# Patient Record
Sex: MALE | Race: WHITE | NOT HISPANIC OR LATINO | Employment: FULL TIME | URBAN - NONMETROPOLITAN AREA
[De-identification: names, ages, dates, MRNs, and addresses within clinical notes are randomized per-mention and may not be internally consistent; named-entity substitution may affect disease eponyms.]

---

## 2017-05-25 ENCOUNTER — OFFICE VISIT (OUTPATIENT)
Dept: FAMILY MEDICINE CLINIC | Facility: CLINIC | Age: 47
End: 2017-05-25

## 2017-05-25 VITALS
DIASTOLIC BLOOD PRESSURE: 90 MMHG | SYSTOLIC BLOOD PRESSURE: 120 MMHG | OXYGEN SATURATION: 98 % | WEIGHT: 177 LBS | HEART RATE: 69 BPM

## 2017-05-25 DIAGNOSIS — T78.3XXS ANGIOEDEMA, SEQUELA: ICD-10-CM

## 2017-05-25 DIAGNOSIS — L50.9 URTICARIA: Primary | ICD-10-CM

## 2017-05-25 PROCEDURE — 99214 OFFICE O/P EST MOD 30 MIN: CPT | Performed by: FAMILY MEDICINE

## 2017-05-25 RX ORDER — EPINEPHRINE 0.3 MG/.3ML
0.3 INJECTION SUBCUTANEOUS ONCE
Qty: 1 EACH | Refills: 3 | Status: SHIPPED | OUTPATIENT
Start: 2017-05-25 | End: 2017-05-25

## 2017-07-21 ENCOUNTER — OFFICE VISIT (OUTPATIENT)
Dept: FAMILY MEDICINE CLINIC | Facility: CLINIC | Age: 47
End: 2017-07-21

## 2017-07-21 VITALS
WEIGHT: 170 LBS | DIASTOLIC BLOOD PRESSURE: 86 MMHG | OXYGEN SATURATION: 98 % | HEIGHT: 70 IN | HEART RATE: 87 BPM | BODY MASS INDEX: 24.34 KG/M2 | SYSTOLIC BLOOD PRESSURE: 110 MMHG

## 2017-07-21 DIAGNOSIS — M54.50 RIGHT-SIDED LOW BACK PAIN WITHOUT SCIATICA, UNSPECIFIED CHRONICITY: ICD-10-CM

## 2017-07-21 DIAGNOSIS — R14.0 ABDOMINAL BLOATING: ICD-10-CM

## 2017-07-21 DIAGNOSIS — L50.9 URTICARIA: ICD-10-CM

## 2017-07-21 DIAGNOSIS — R19.7 DIARRHEA, UNSPECIFIED TYPE: Primary | ICD-10-CM

## 2017-07-21 LAB
BILIRUB BLD-MCNC: NEGATIVE MG/DL
CLARITY, POC: CLEAR
COLOR UR: YELLOW
GLUCOSE UR STRIP-MCNC: NEGATIVE MG/DL
KETONES UR QL: NEGATIVE
LEUKOCYTE EST, POC: NEGATIVE
NITRITE UR-MCNC: NEGATIVE MG/ML
PH UR: 7 [PH] (ref 5–8)
PROT UR STRIP-MCNC: NEGATIVE MG/DL
RBC # UR STRIP: NEGATIVE /UL
SP GR UR: 1.01 (ref 1–1.03)
UROBILINOGEN UR QL: NORMAL

## 2017-07-21 PROCEDURE — 99214 OFFICE O/P EST MOD 30 MIN: CPT | Performed by: FAMILY MEDICINE

## 2017-07-21 PROCEDURE — 81003 URINALYSIS AUTO W/O SCOPE: CPT | Performed by: FAMILY MEDICINE

## 2017-07-21 NOTE — PROGRESS NOTES
Subjective   Jas Porras is a 47 y.o. male.     History of Present Illness   [age]-year-old male who was treated earlier for urticaria.  Has developed loose stool and some right sided back or flank pain.  Also some abdominal bloating .  Went to an urgent treatment center around [place] where he had an ultrasound of his gallbladder.  Apparently all this was okay.  Also ended up seeing a surgeon and also had a CAT scan.  There was some indication that he might have sludge in his gallbladder.  Apparently the surgeon discounted this.  He was recommended to a gastroenterologist and has an appointment to see Dr. Chaidez in August.  He is here today to get my opinion.  He is not had any general blood work.  The following portions of the patient's history were reviewed and updated as appropriate: allergies, current medications, past family history, past medical history, past social history, past surgical history and problem list.    Review of Systems   Constitutional: Negative for chills, fever and unexpected weight change.   HENT: Negative.    Respiratory: Negative for cough and shortness of breath.    Cardiovascular: Negative for chest pain.   Gastrointestinal: Positive for abdominal distention, abdominal pain and diarrhea. Negative for nausea.   Genitourinary: Negative for dysuria and hematuria.   Musculoskeletal: Positive for back pain (right flank painright back pain). Negative for gait problem and joint swelling.   Skin: Negative for rash.   Psychiatric/Behavioral: Negative.        Objective   Physical Exam   Constitutional: He is oriented to person, place, and time. He appears well-developed and well-nourished.   HENT:   Right Ear: External ear normal.   Left Ear: External ear normal.   Nose: Nose normal.   Mouth/Throat: Oropharynx is clear and moist.   Eyes: Conjunctivae and EOM are normal.   Neck: Normal range of motion. Neck supple. No thyromegaly present.   Cardiovascular: Normal rate, regular rhythm and  normal heart sounds.    No murmur heard.  Pulmonary/Chest: Effort normal and breath sounds normal.   Abdominal: Soft. Bowel sounds are normal. He exhibits no mass. There is no tenderness.   Genitourinary: Rectum normal, prostate normal and penis normal.   Musculoskeletal: Normal range of motion. He exhibits no edema.   Lymphadenopathy:     He has no cervical adenopathy.     He has no axillary adenopathy.        Right: No inguinal and no supraclavicular adenopathy present.        Left: No inguinal and no supraclavicular adenopathy present.   Neurological: He is alert and oriented to person, place, and time. Coordination normal.   Skin: Skin is warm and dry. No rash noted. No pallor.   Psychiatric: He has a normal mood and affect. His behavior is normal. Thought content normal.   Vitals reviewed.      Assessment/Plan   Jas was seen today for diarrhea, headache and rash.    Diagnoses and all orders for this visit:    Diarrhea, unspecified type  -     Sedimentation Rate  -     C-reactive protein    Urticaria    Right-sided low back pain without sciatica, unspecified chronicity    Abdominal bloating  -     CBC & Differential  -     Comprehensive Metabolic Panel  -     TSH  -     POC Urinalysis Dipstick, Automated    47-year-old male who has several complaints, some of which may be interrelated or not.  He feels that he has abdominal distention and still has some episodes of urticaria which were diagnosed earlier.  Also some vague flank pain.  It's my opinion, that all of these may be related and as a result of some sort of allergic phenomena.  I told him that he still may end up seeing an allergist.    Diarrhea, more like loose stool and copious diarrhea.  We are going to do some stool studies however along with some general blood work.  I've given him an order to get 3 stool specimens which will include stool culture, O&P ×3 and C. difficile toxin.  I'm doing this and anticipation of his visit with Dr. Chaidez.  I  will follow-up in a week or so to go over these and see how he's doing.  In coordination with Dr. Chaidez will try to resolve this problem.  Again, may involve an allergist.

## 2017-07-22 LAB
ALBUMIN SERPL-MCNC: 4.4 G/DL (ref 3.5–5.5)
ALBUMIN/GLOB SERPL: 1.7 {RATIO} (ref 1.2–2.2)
ALP SERPL-CCNC: 153 IU/L (ref 39–117)
ALT SERPL-CCNC: 13 IU/L (ref 0–44)
AST SERPL-CCNC: 18 IU/L (ref 0–40)
BASOPHILS # BLD AUTO: 0 X10E3/UL (ref 0–0.2)
BASOPHILS NFR BLD AUTO: 0 %
BILIRUB SERPL-MCNC: 0.4 MG/DL (ref 0–1.2)
BUN SERPL-MCNC: 10 MG/DL (ref 6–24)
BUN/CREAT SERPL: 13 (ref 9–20)
CALCIUM SERPL-MCNC: 9.2 MG/DL (ref 8.7–10.2)
CHLORIDE SERPL-SCNC: 103 MMOL/L (ref 96–106)
CO2 SERPL-SCNC: 23 MMOL/L (ref 18–29)
CREAT SERPL-MCNC: 0.78 MG/DL (ref 0.76–1.27)
CRP SERPL-MCNC: 0.9 MG/L (ref 0–4.9)
EOSINOPHIL # BLD AUTO: 0.3 X10E3/UL (ref 0–0.4)
EOSINOPHIL NFR BLD AUTO: 4 %
ERYTHROCYTE [DISTWIDTH] IN BLOOD BY AUTOMATED COUNT: 13.2 % (ref 12.3–15.4)
ERYTHROCYTE [SEDIMENTATION RATE] IN BLOOD BY WESTERGREN METHOD: 4 MM/HR (ref 0–15)
GLOBULIN SER CALC-MCNC: 2.6 G/DL (ref 1.5–4.5)
GLUCOSE SERPL-MCNC: 90 MG/DL (ref 65–99)
HCT VFR BLD AUTO: 45.2 % (ref 37.5–51)
HGB BLD-MCNC: 15.3 G/DL (ref 12.6–17.7)
IMM GRANULOCYTES # BLD: 0 X10E3/UL (ref 0–0.1)
IMM GRANULOCYTES NFR BLD: 0 %
LYMPHOCYTES # BLD AUTO: 2 X10E3/UL (ref 0.7–3.1)
LYMPHOCYTES NFR BLD AUTO: 29 %
MCH RBC QN AUTO: 30.6 PG (ref 26.6–33)
MCHC RBC AUTO-ENTMCNC: 33.8 G/DL (ref 31.5–35.7)
MCV RBC AUTO: 90 FL (ref 79–97)
MONOCYTES # BLD AUTO: 0.5 X10E3/UL (ref 0.1–0.9)
MONOCYTES NFR BLD AUTO: 8 %
NEUTROPHILS # BLD AUTO: 4.1 X10E3/UL (ref 1.4–7)
NEUTROPHILS NFR BLD AUTO: 59 %
PLATELET # BLD AUTO: 117 X10E3/UL (ref 150–379)
POTASSIUM SERPL-SCNC: 3.9 MMOL/L (ref 3.5–5.2)
PROT SERPL-MCNC: 7 G/DL (ref 6–8.5)
RBC # BLD AUTO: 5 X10E6/UL (ref 4.14–5.8)
SODIUM SERPL-SCNC: 143 MMOL/L (ref 134–144)
TSH SERPL DL<=0.005 MIU/L-ACNC: 1.67 UIU/ML (ref 0.45–4.5)
WBC # BLD AUTO: 7 X10E3/UL (ref 3.4–10.8)

## 2017-07-31 ENCOUNTER — OFFICE VISIT (OUTPATIENT)
Dept: FAMILY MEDICINE CLINIC | Facility: CLINIC | Age: 47
End: 2017-07-31

## 2017-07-31 VITALS
WEIGHT: 172 LBS | BODY MASS INDEX: 24.62 KG/M2 | SYSTOLIC BLOOD PRESSURE: 110 MMHG | OXYGEN SATURATION: 98 % | DIASTOLIC BLOOD PRESSURE: 80 MMHG | HEIGHT: 70 IN | HEART RATE: 68 BPM

## 2017-07-31 DIAGNOSIS — L50.9 URTICARIA: ICD-10-CM

## 2017-07-31 DIAGNOSIS — R19.7 DIARRHEA, UNSPECIFIED TYPE: Primary | ICD-10-CM

## 2017-07-31 PROCEDURE — 99212 OFFICE O/P EST SF 10 MIN: CPT | Performed by: FAMILY MEDICINE

## 2017-07-31 NOTE — PROGRESS NOTES
Subjective   Jas Porras is a 47 y.o. male.     History of Present Illness   47-year-old male who's had persistent diarrhea.  Has seen a surgeon in the area of [facility].  He is here to review stool studies that I ordered.  He does have an appointment to see a gastroenterologist.  The following portions of the patient's history were reviewed and updated as appropriate: allergies, current medications, past family history, past medical history, past social history, past surgical history and problem list.    Review of Systems   Constitutional: Negative for fever and unexpected weight change.   Gastrointestinal: Positive for diarrhea. Negative for abdominal distention, nausea and vomiting.       Objective   Physical Exam   Constitutional: He appears well-nourished.   HENT:   Head: Normocephalic.   Pulmonary/Chest: Effort normal.   Neurological: He is alert.       Assessment/Plan   Jas was seen today for diarrhea.    Diagnoses and all orders for this visit:    Diarrhea, unspecified type    Urticaria    Diarrhea.  Here for general follow-up.  I have reviewed the stool studies which should been ordered.  Negative culture.  No O&P.  No CDF.  He has an appointment to see the gastroenterologist and I do think this is the next step.    Urticaria.  I am reminded that he has had some hives.  I did say that he may end up seeing an allergist as all of this may be related to some food allergy etc.

## 2017-08-03 ENCOUNTER — OFFICE VISIT (OUTPATIENT)
Dept: FAMILY MEDICINE CLINIC | Facility: CLINIC | Age: 47
End: 2017-08-03

## 2017-08-03 ENCOUNTER — TELEPHONE (OUTPATIENT)
Dept: FAMILY MEDICINE CLINIC | Facility: CLINIC | Age: 47
End: 2017-08-03

## 2017-08-03 VITALS
OXYGEN SATURATION: 98 % | DIASTOLIC BLOOD PRESSURE: 80 MMHG | BODY MASS INDEX: 24.68 KG/M2 | HEART RATE: 90 BPM | WEIGHT: 172 LBS | TEMPERATURE: 99.1 F | SYSTOLIC BLOOD PRESSURE: 110 MMHG

## 2017-08-03 DIAGNOSIS — T78.3XXS ANGIOEDEMA, SEQUELA: ICD-10-CM

## 2017-08-03 DIAGNOSIS — L50.9 URTICARIA: Primary | ICD-10-CM

## 2017-08-03 PROCEDURE — 99213 OFFICE O/P EST LOW 20 MIN: CPT | Performed by: FAMILY MEDICINE

## 2017-08-03 RX ORDER — DIPHENHYDRAMINE HCL 25 MG
25 CAPSULE ORAL EVERY 6 HOURS PRN
COMMUNITY

## 2017-08-03 NOTE — TELEPHONE ENCOUNTER
----- Message from Deedee Spence sent at 8/2/2017  3:08 PM EDT -----  PATIENT'S WIFE CALLED AND STATED THAT PATIENT HAD A NOTHER FLARE UP AND DID NOT WANT TO WAIT UNTIL AFTER SEEING GASTRO AS PREVIOUSLY STATED TO SEE AND ALLERGIST.     PATIENT IS REQUESTING A REFERRAL ASAP    PLEASE CALL PATIENT [phone] OR WIFE [phone]

## 2017-08-03 NOTE — PROGRESS NOTES
Subjective   Jas Porras is a 47 y.o. male.     History of Present Illness   47-year-old male with hives and angioedema.  Had an appointment to see the allergist.  It was canceled because he had to take some Benadryl.  Has been rescheduled.  He shows me a picture from this morning when his lower lip was obviously swollen.  Denies any respiratory distress or stridor.  The following portions of the patient's history were reviewed and updated as appropriate: allergies, current medications, past family history, past medical history, past social history, past surgical history and problem list.    Review of Systems   Constitutional: Negative for chills and unexpected weight change.   Respiratory: Negative for cough and shortness of breath.    Skin: Positive for rash.   Neurological: Negative for dizziness and facial asymmetry.       Objective   Physical Exam   Constitutional: He appears well-developed and well-nourished.   HENT:   Right Ear: External ear normal.   Left Ear: External ear normal.   Mouth/Throat: Oropharynx is clear and moist.   Eyes: EOM are normal. Pupils are equal, round, and reactive to light.   Neck: Neck supple.   Cardiovascular: Normal rate, regular rhythm and normal heart sounds.    Pulmonary/Chest: Effort normal and breath sounds normal.   Vitals reviewed.      Assessment/Plan   Jas was seen today for nasal congestion, chills, facial swelling and urticaria.    Diagnoses and all orders for this visit:    Urticaria    Angioedema, sequela    There is no question that were dealing with urticaria and angioedema.  Offered steroids but he doesn't want anything which will interfere with his testing at the allergist.  Since he is doing much better than this morning were going to give this more time.  I did write out a prescription for a Medrol Dosepak.  If things just get much worse he is to fill it immediately